# Patient Record
Sex: MALE | Race: WHITE
[De-identification: names, ages, dates, MRNs, and addresses within clinical notes are randomized per-mention and may not be internally consistent; named-entity substitution may affect disease eponyms.]

---

## 2018-02-05 ENCOUNTER — HOSPITAL ENCOUNTER (EMERGENCY)
Dept: HOSPITAL 31 - C.ER | Age: 48
Discharge: HOME | End: 2018-02-05
Payer: COMMERCIAL

## 2018-02-05 VITALS
OXYGEN SATURATION: 99 % | SYSTOLIC BLOOD PRESSURE: 114 MMHG | DIASTOLIC BLOOD PRESSURE: 74 MMHG | HEART RATE: 72 BPM | RESPIRATION RATE: 18 BRPM | TEMPERATURE: 98.8 F

## 2018-02-05 VITALS — BODY MASS INDEX: 39.6 KG/M2

## 2018-02-05 DIAGNOSIS — M54.30: Primary | ICD-10-CM

## 2018-02-05 NOTE — C.PDOC
History Of Present Illness


48 y/o male presents to ED with complaints of right sided back radiating to 

groin and right leg for 3 weeks. Patient states pain is worse with movement and

  denies dysuria, numbness, weakness or any other complaints at this time. 


Time Seen by Provider: 02/05/18 10:25


Chief Complaint (Nursing): Back Pain


History Per: Patient


History/Exam Limitations: no limitations


Onset/Duration Of Symptoms: Days


Current Symptoms Are (Timing): Still Present


Quality Of Discomfort: "Pain"





Past Medical History


Reviewed: Historical Data, Nursing Documentation, Vital Signs


Vital Signs: 


 Last Vital Signs











Temp  98.8 F   02/05/18 10:00


 


Pulse  72   02/05/18 10:00


 


Resp  18   02/05/18 10:00


 


BP  114/74   02/05/18 10:00


 


Pulse Ox  99   02/05/18 10:28














- Medical History


PMH: Hypercholesterolemia


Surgical History: Appendectomy


Family History: States: No Known Family Hx





- Social History


Hx Tobacco Use: Yes


Hx Alcohol Use: Yes


Hx Substance Use: No





- Immunization History


Hx Tetanus Toxoid Vaccination: No


Hx Influenza Vaccination: Yes


Hx Pneumococcal Vaccination: No





Review Of Systems


Constitutional: Negative for: Fever, Chills


Gastrointestinal: Negative for: Nausea, Vomiting


Genitourinary: Negative for: Dysuria, Hematuria


Musculoskeletal: Positive for: Back Pain


Neurological: Negative for: Weakness, Numbness





Physical Exam





- Physical Exam


Appears: Non-toxic, No Acute Distress


Skin: Warm, Dry, No Rash


Head: Atraumatic, Normacephalic


Oral Mucosa: Moist


Neck: Supple


Cardiovascular: Rhythm Regular


Respiratory: Normal Breath Sounds, No Rales, No Rhonchi, No Wheezing


Gastrointestinal/Abdominal: Soft, No Tenderness, No Guarding, No Rebound


Back: No CVA Tenderness, Straight Leg Raising (pain is worse), Other (Right 

para sacral pain on palpation radiating to back of leg)


Extremity: Normal ROM, Capillary Refill (<2 seconds)


Neurological/Psych: Oriented x3





ED Course And Treatment


O2 Sat by Pulse Oximetry: 99 (RA)


Pulse Ox Interpretation: Normal





Medical Decision Making


Medical Decision Making: 


Impression: Back pain





Plan: Muscle relaxant and anti inflammatory medication will be given 


Pt will be discharged and advised if pain is persistent to have imaging done 

for further evaluation 





Disposition





- Disposition


Referrals: 


[facility] [Outside]


Disposition: HOME/ ROUTINE


Disposition Time: 10:25


Condition: GOOD


Prescriptions: 


Cyclobenzaprine [Cyclobenzaprine HCl] 10 mg PO TID #15 tab


Naproxen [Naprosyn] 500 mg PO BID #20 tablet


Instructions:  Sciatica (ED)


Forms:  [facility] (English)





- Clinical Impression


Clinical Impression: 


 Sciatica








- Scribe Statement


The provider has reviewed the documentation as recorded by the Trayiblarry Guerra





All medical record entries made by the Trayiblarry were at my direction and 

personally dictated by me. I have reviewed the chart and agree that the record 

accurately reflects my personal performance of the history, physical exam, 

medical decision making, and the department course for this patient. I have 

also personally directed, reviewed, and agree with the discharge instructions 

and disposition.

## 2018-11-04 ENCOUNTER — HOSPITAL ENCOUNTER (EMERGENCY)
Dept: HOSPITAL 31 - C.ER | Age: 48
Discharge: HOME | End: 2018-11-04
Payer: SELF-PAY

## 2018-11-04 VITALS — SYSTOLIC BLOOD PRESSURE: 143 MMHG | HEART RATE: 68 BPM | DIASTOLIC BLOOD PRESSURE: 93 MMHG | TEMPERATURE: 98.8 F

## 2018-11-04 VITALS — RESPIRATION RATE: 18 BRPM

## 2018-11-04 VITALS — BODY MASS INDEX: 39.6 KG/M2
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
## 2018-11-04 DIAGNOSIS — S39.012A: Primary | ICD-10-CM

## 2018-11-04 PROCEDURE — 99284 EMERGENCY DEPT VISIT MOD MDM: CPT

## 2018-11-04 PROCEDURE — 96372 THER/PROPH/DIAG INJ SC/IM: CPT

## 2018-11-04 PROCEDURE — 72100 X-RAY EXAM L-S SPINE 2/3 VWS: CPT

## 2018-11-04 NOTE — RAD
Date of service: 



11/04/2018



PROCEDURE:  Radiographs of the Lumbar Spine.



HISTORY:

L sided low back pain







COMPARISON:

No prior.



FINDINGS:



BONES:

Normal alignment. No listhesis. No fracture.



DISC SPACES:

Minimal multilevel spondylosis appreciated diffusely.



OTHER FINDINGS:

None.



IMPRESSION:

No fracture or spondylolisthesis.  Minimal multilevel spondylosis 

diffusely noted.  No destructive lesion appreciable.

## 2018-11-04 NOTE — C.PDOC
History Of Present Illness


47-year-old male, presents to the emergency department with complaints of back 

pain x3 weeks. 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 

heavy objects. He denies any numbness/weakness, nausea/vomiting, direct trauma, 

bladder/bowel incontinence, or any other associated symptoms. No other 

complaints at this time.


Time Seen by Provider: 11/04/18 07:27


Chief Complaint (Nursing): Back Pain


History Per: Patient


History/Exam Limitations: no limitations





Past Medical History


Reviewed: Historical Data, Nursing Documentation, Vital Signs


Vital Signs: 





                                Last Vital Signs











Temp  98.3 F   11/04/18 07:19


 


Pulse  63   11/04/18 07:19


 


Resp  18   11/04/18 07:19


 


BP  144/90   11/04/18 07:19


 


Pulse Ox  99   11/04/18 07:19














- Medical History


PMH: Hypercholesterolemia


Surgical History: Appendectomy


Family History: States: No Known Family Hx





- Social History


Hx Tobacco Use: Yes


Hx Alcohol Use: Yes


Hx Substance Use: No





- Immunization History


Hx Tetanus Toxoid Vaccination: No


Hx Influenza Vaccination: Yes


Hx Pneumococcal Vaccination: No





Review Of Systems


Constitutional: Negative for: Fever, Chills


Cardiovascular: Negative for: Chest Pain


Gastrointestinal: Negative for: Nausea, Vomiting


Genitourinary: Negative for: Incontinence


Musculoskeletal: Positive for: Back Pain


Neurological: Negative for: Weakness, Numbness





Physical Exam





- Physical Exam


Appears: Non-toxic, No Acute Distress


Skin: Warm, Dry, No Rash


Head: Atraumatic, Normacephalic


Eye(s): bilateral: Normal Inspection


Nose: Normal


Oral Mucosa: Moist


Lips: Normal Appearing


Neck: Normal ROM, No Midline Cervical Tenderness, No Paracervical Tenderness, 

Supple


Chest: Symmetrical


Cardiovascular: Rhythm Regular, No Friction Rub, No Murmur


Respiratory: Normal Breath Sounds, No Accessory Muscle Use


Gastrointestinal/Abdominal: Bowel Sounds (active), Soft, No Tenderness


Back: No CVA Tenderness, No Vertebral Tenderness, Paraspinal Tenderness (left)


Extremity: Normal ROM, No Deformity, No Swelling


Pulses: Left Dorsalis Pedis: Normal, Right Dorsalis Pedis: Normal


Neurological/Psych: Oriented x3, Normal Speech, Normal Motor, Normal Sensation


Gait: Steady





ED Course And Treatment


O2 Sat by Pulse Oximetry: 99


Pulse Ox Interpretation: Normal (RA)





Medical Decision Making


Medical Decision Making: 





Plan:


* Decadron, Lidocaine, Toradol


* XR Spine


* Reassess and Disposition





On re-exam, the patient reports improvement of symptoms. Lungs are CTA, heart is

RRR, abdomen is soft, non-tender and the patient is tolerating Po well. Follow 

up with the medical doctor within 1-2 days, Return if worsened. 





Disposition





- Disposition


Referrals: 


Kaden Abreu MD [Medical Doctor] - 


Disposition: HOME/ ROUTINE


Disposition Time: 09:15


Condition: STABLE


Additional Instructions: 


Follow up with the medical doctor within 1-2 days. Return if worsened. 


Prescriptions: 


Diazepam [Valium] 2 mg PO TID #21 tab


Lidocaine 5% [Lidoderm] 1 each TP DAILY #10 patch


Naproxen [Naprosyn] 500 mg PO BID #20 tab


Instructions:  Low Back Pain in Adults


Forms:  [facility] (English), Work Excuse





- Clinical Impression


Clinical Impression: 


 Low back strain








- Scribe Statement


The provider has reviewed the documentation as recorded by the Scribe (Axel Chan)








All medical record entries made by the Scribe were at my direction and 

personally dictated by me. I have reviewed the chart and agree that the record 

accurately reflects my personal performance of the history, physical exam, 

medical decision making, and the department course for this patient. I have also

 personally directed, reviewed, and agree with the discharge instructions and 

disposition.

## 2018-11-05 VITALS — OXYGEN SATURATION: 99 %
